# Patient Record
Sex: FEMALE | Race: BLACK OR AFRICAN AMERICAN | Employment: UNEMPLOYED | URBAN - METROPOLITAN AREA
[De-identification: names, ages, dates, MRNs, and addresses within clinical notes are randomized per-mention and may not be internally consistent; named-entity substitution may affect disease eponyms.]

---

## 2020-09-19 ENCOUNTER — HOSPITAL ENCOUNTER (EMERGENCY)
Age: 50
Discharge: HOME | End: 2020-09-19
Payer: COMMERCIAL

## 2020-09-19 VITALS
DIASTOLIC BLOOD PRESSURE: 90 MMHG | OXYGEN SATURATION: 98 % | RESPIRATION RATE: 16 BRPM | BODY MASS INDEX: 40.53 KG/M2 | TEMPERATURE: 99 F | HEART RATE: 85 BPM | WEIGHT: 236.11 LBS | SYSTOLIC BLOOD PRESSURE: 128 MMHG

## 2020-09-19 PROCEDURE — 99283 EMERGENCY DEPT VISIT LOW MDM: CPT

## 2020-09-19 RX ORDER — PENICILLIN V POTASSIUM 500 MG/1
500 TABLET ORAL 4 TIMES DAILY
Qty: 28 TABLET | Refills: 0 | Status: SHIPPED | OUTPATIENT
Start: 2020-09-19 | End: 2020-09-26

## 2020-09-19 ASSESSMENT — ENCOUNTER SYMPTOMS
ABDOMINAL PAIN: 0
ABDOMINAL DISTENTION: 0
VOMITING: 0
COLOR CHANGE: 0
EYE REDNESS: 0
DIARRHEA: 0
NAUSEA: 0
BACK PAIN: 0
SINUS PRESSURE: 0
SINUS PAIN: 0
SHORTNESS OF BREATH: 0
EYE PAIN: 0

## 2020-09-19 ASSESSMENT — PAIN DESCRIPTION - LOCATION: LOCATION: EAR

## 2020-09-19 ASSESSMENT — PAIN SCALES - GENERAL: PAINLEVEL_OUTOF10: 8

## 2020-09-19 ASSESSMENT — PAIN DESCRIPTION - ORIENTATION: ORIENTATION: RIGHT;LEFT

## 2020-09-19 ASSESSMENT — PAIN DESCRIPTION - FREQUENCY: FREQUENCY: CONTINUOUS

## 2020-09-19 ASSESSMENT — PAIN DESCRIPTION - PAIN TYPE: TYPE: ACUTE PAIN

## 2020-09-19 ASSESSMENT — PAIN DESCRIPTION - PROGRESSION: CLINICAL_PROGRESSION: GRADUALLY WORSENING

## 2020-09-19 ASSESSMENT — PAIN - FUNCTIONAL ASSESSMENT: PAIN_FUNCTIONAL_ASSESSMENT: ACTIVITIES ARE NOT PREVENTED

## 2020-09-19 ASSESSMENT — PAIN DESCRIPTION - ONSET: ONSET: ON-GOING

## 2020-09-19 ASSESSMENT — PAIN DESCRIPTION - DESCRIPTORS: DESCRIPTORS: DISCOMFORT

## 2020-09-20 NOTE — ED PROVIDER NOTES
Respiratory: Negative for shortness of breath. Cardiovascular: Negative for chest pain. Gastrointestinal: Negative for abdominal distention, abdominal pain, diarrhea, nausea and vomiting. Musculoskeletal: Negative for back pain. Skin: Negative for color change and rash. Allergic/Immunologic: Negative for immunocompromised state. Neurological: Positive for dizziness and headaches. Negative for syncope, speech difficulty, weakness and numbness. Hematological: Negative for adenopathy. Psychiatric/Behavioral: Negative for confusion. All other systems reviewed and are negative. Positives and Pertinent negatives as per HPI. Except as noted above in the ROS, problem specific ROS was completed and is negative. Physical Exam:  Physical Exam  Vitals signs and nursing note reviewed. Constitutional:       General: She is not in acute distress. Appearance: Normal appearance. She is well-developed. She is not toxic-appearing. HENT:      Head: Normocephalic and atraumatic. Right Ear: Tympanic membrane and ear canal normal.      Left Ear: Tympanic membrane and ear canal normal.      Mouth/Throat:      Lips: Pink. Mouth: Mucous membranes are moist.      Dentition: Abnormal dentition. Pharynx: Oropharynx is clear. Eyes:      General: No scleral icterus. Extraocular Movements: Extraocular movements intact. Conjunctiva/sclera: Conjunctivae normal.      Pupils: Pupils are equal, round, and reactive to light. Comments: No skew deviation   Neck:      Musculoskeletal: Full passive range of motion without pain and neck supple. No neck rigidity. Vascular: No JVD. Cardiovascular:      Rate and Rhythm: Normal rate and regular rhythm. Heart sounds: Normal heart sounds. Pulmonary:      Effort: Pulmonary effort is normal. No respiratory distress. Breath sounds: Normal breath sounds. Abdominal:      General: There is no distension.       Palpations: Abdomen is soft. Abdomen is not rigid. Tenderness: There is no abdominal tenderness. Musculoskeletal: Normal range of motion. Skin:     General: Skin is warm and dry. Capillary Refill: Capillary refill takes less than 2 seconds. Findings: No rash. Neurological:      Comments: NIH 0 with no focal deficits. Patient is awake, alert & oriented x4 and speaking in complete sentences without dysphasia or slurring of their words, CN II-XII grossly intact, good coordination with normal finger-to-nose and heel-to-shin test, 5/5 motor strength in all 4 extremities, sensation to light touch intact bilaterally, patellar and achilles reflexes 2+ and equal bilaterally, gait is normal without ataxia, no truncal ataxia. Psychiatric:         Mood and Affect: Mood normal.         MEDICAL DECISION MAKING    Vitals:    Vitals:    09/19/20 2153   BP: (!) 128/90   Pulse: 85   Resp: 16   Temp: 99 °F (37.2 °C)   TempSrc: Oral   SpO2: 98%   Weight: 236 lb 1.8 oz (107.1 kg)       LABS:Labs Reviewed - No data to display     Remainder of labs reviewed and werenegative at this time or not returned at the time of this note. RADIOLOGY:   Non-plain film images such as CT, Ultrasound and MRI are read by the radiologist. RIAZ Huitron CNP have directly visualized the radiologic plain film image(s) with the below findings:        Interpretation per the Radiologist below, if available at the time of this note:    No orders to display        No results found. MEDICAL DECISION MAKING / ED COURSE:      PROCEDURES:   Procedures    None    Patient was given:  Medications - No data to display    Differential Diagnosis: Dental Abscess, Airway Obstruction, Umer's Angina, Bacteremia/Sepsis.   Differential diagnosis: Ménière's disease, benign positional vertigo, stroke or TIA in the posterior circulation, bleed of the cerebellopontine angle, cardiac arrhythmia, anemia, dehydration, sepsis, Metabolic emergency, Multiple Sclerosis, brain or ENT tumor, other    Patient seen and examined today for headache, dizziness, ear pain, tooth pain. See HPI for patient presentation. Patient is hemodynamically stable, nontoxic, afebrile, and without tachycardia, tachypnea, and hypoxia. Physical exam as above. Well-appearing pleasant 44-year-old female lying in bed in no acute distress. She is awake alert and oriented with absolutely no neurovascular deficits. She denies any headache or dizziness on my exam.  I had her ambulate around the emergency department and she ambulated with normal gait. I did a complete neurovascular exam and she had no deficits and she had an NIH of 0. I have no suspicion for stroke or other intracranial abnormality. TMs are benign bilaterally. Tooth #20 is cracked which she thinks is causing the symptoms. She has no chest pain or shortness of breath and I have no suspicion for ACS or PE. Patient be started on penicillin and discharged home in stable condition. Patient denies dysphasia, dyspnea, neck stiffness or odynophagia. There is no brawny edema, uvular deviation, meningismus, stridor, trismus or drooling. I estimate there is LOW risk for a DEEP SPACE INFECTION (e.g., ERIKAS ANGINA OR RETROPHARYNGEAL ABSCESS), MENINGITIS, or AIRWAY COMPROMISE. There is also NO HEART MURMUR NOTED ON EXAM thus I consider the discharge disposition reasonable. Also,  Patient has a repeat neurological exam. At this time there is no indication of head injury, encephalopathy, multiple sclerosis, TIA/CVA, seizures, dehydration, hypoglycemia, mass lesions, metabolic cause, cardiac arrhythmia, PE, GI bleeding or orthostatic hypotension. Patient is stable throughout ED course and safe for outpatient follow-up. Patient made aware of treatment plan and verbally understood and agreed. Patient stable for outpatient follow-up with their family doctor in 24-48 hours.   At this time, the evidence for any other entities in the differential is insufficient to justify any further testing. This was explained to the patient. The patient was advised that persistent or worsening symptoms will require further evaluation. The patient tolerated their visit well. I evaluated the patient. The physician was available for consultation as needed. The patient and / or the family were informed of the results of any tests, a time was given to answer questions, a plan was proposed and they agreed with plan. CLINICAL IMPRESSION:  1.  Cracked tooth        DISPOSITION Decision To Discharge 09/19/2020 10:03:49 PM      PATIENT REFERRED TO:  [address] [place] [address]    Schedule an appointment as soon as possible for a visit       [facility]  [address] [address]  [phone]  Go to   As needed      DISCHARGE MEDICATIONS:  New Prescriptions    PENICILLIN V POTASSIUM (VEETID) 500 MG TABLET    Take 1 tablet by mouth 4 times daily for 7 days       DISCONTINUED MEDICATIONS:  Discontinued Medications    No medications on file              (Please note the MDM and HPI sections of this note were completed with a voice recognition program.  Efforts were made to edit the dictations but occasionally words are mis-transcribed.)    Electronically signed, RIAZ Lora CNP,           RIAZ Lora CNP  09/19/20 2414

## 2020-09-21 ENCOUNTER — CARE COORDINATION (OUTPATIENT)
Dept: CASE MANAGEMENT | Age: 50
End: 2020-09-21

## 2020-09-21 NOTE — CARE COORDINATION
healthcare professional if you have concerns about COVID-19 and your underlying condition or if you are sick. For more information on steps you can take to protect yourself, see CDC's How to [place] for follow-up call in 7-14 days.

## 2020-10-07 ENCOUNTER — CARE COORDINATION (OUTPATIENT)
Dept: CASE MANAGEMENT | Age: 50
End: 2020-10-07

## 2020-10-07 NOTE — CARE COORDINATION
Patient resolved from the Care Transitions episode on 10/7/2020  Discussed COVID-19 related testing which was not done at this time. Patient has been provided the following resources and education related to COVID-19:                         Signs, symptoms and red flags related to COVID-19            CDC exposure and quarantine guidelines            [facility] exposure contact - [phone]            Contact for their local Department of Health                patient reports all symptoms resolved and no new/worsening symptoms     No further outreach scheduled with this ACM. Episode of Care resolved. Patient has this ACM contact information if future needs arise.

## 2021-09-08 ENCOUNTER — HOSPITAL ENCOUNTER (EMERGENCY)
Age: 51
Discharge: HOME | End: 2021-09-08
Payer: COMMERCIAL

## 2021-09-08 VITALS
DIASTOLIC BLOOD PRESSURE: 77 MMHG | OXYGEN SATURATION: 99 % | HEART RATE: 64 BPM | RESPIRATION RATE: 17 BRPM | TEMPERATURE: 98.3 F | SYSTOLIC BLOOD PRESSURE: 149 MMHG

## 2021-09-08 DIAGNOSIS — U07.1 COVID-19: Primary | ICD-10-CM

## 2021-09-08 DIAGNOSIS — R51.9 ACUTE NONINTRACTABLE HEADACHE, UNSPECIFIED HEADACHE TYPE: ICD-10-CM

## 2021-09-08 PROCEDURE — 99283 EMERGENCY DEPT VISIT LOW MDM: CPT

## 2021-09-08 PROCEDURE — 6370000000 HC RX 637 (ALT 250 FOR IP): Performed by: PHYSICIAN ASSISTANT

## 2021-09-08 RX ORDER — ACETAMINOPHEN 325 MG/1
650 TABLET ORAL ONCE
Status: COMPLETED | OUTPATIENT
Start: 2021-09-08 | End: 2021-09-08

## 2021-09-08 RX ADMIN — ACETAMINOPHEN 650 MG: 325 TABLET ORAL at 10:45

## 2021-09-08 ASSESSMENT — PAIN SCALES - GENERAL
PAINLEVEL_OUTOF10: 9
PAINLEVEL_OUTOF10: 8

## 2021-09-08 ASSESSMENT — PAIN DESCRIPTION - FREQUENCY: FREQUENCY: CONTINUOUS

## 2021-09-08 ASSESSMENT — ENCOUNTER SYMPTOMS
RESPIRATORY NEGATIVE: 1
ABDOMINAL PAIN: 0
RHINORRHEA: 0
VOMITING: 0
NAUSEA: 0

## 2021-09-08 ASSESSMENT — PAIN DESCRIPTION - LOCATION: LOCATION: HEAD

## 2021-09-08 ASSESSMENT — PAIN DESCRIPTION - DESCRIPTORS: DESCRIPTORS: ACHING

## 2021-09-08 ASSESSMENT — PAIN DESCRIPTION - PAIN TYPE: TYPE: ACUTE PAIN

## 2021-09-08 ASSESSMENT — PAIN DESCRIPTION - ONSET: ONSET: GRADUAL

## 2021-09-08 ASSESSMENT — PAIN DESCRIPTION - PROGRESSION: CLINICAL_PROGRESSION: NOT CHANGED

## 2021-09-08 NOTE — ED PROVIDER NOTES
[address]  [address] [address]  Dept: [phone]  Loc: [phone]  eMERGENCYdEPARTMENT eNCOUnter      Pt Name: Pj Comer  MRN: [id number]  Jade 1970  Date of evaluation: 9/8/2021  Provider:Shana Mabry PA-C    CHIEF COMPLAINT       Chief Complaint   Patient presents with    Headache     was dx with covid 10 days ago symptoms started 13 days ago, has had headaches on and off, she reports headache started yesterday that the base of her skull        CRITICAL CARE TIME   Total Critical Care time was 0 minutes, excluding separately reportable procedures. There was a high probability of clinically significant/life threatening deterioration in the patient's condition which required my urgentintervention. HISTORY OF PRESENT ILLNESS  (Location/Symptom, Timing/Onset, Context/Setting, Quality, Duration,Modifying Factors, Severity.)   Pj Comer is a 52 y.o. female who presents to the emergency department by private vehicle with complaints of headache and concern for low blood pressure. Patient has a known COVID-19. Patient is been sick for the past 13 days, diagnosed 10 days ago. Patient has had intermittent headaches that wax and wane in severity since onset. Headache is relieved with ibuprofen. He describes a pressure sensation to the back of head. She has no associated nausea, vomiting, vision changes, extremity numbness/tingling/weakness. Headache was not alleviated with ibuprofen today. Patient also has been taking her antihypertensives and not been eating significant amount of food. She is concerned blood pressure was low. Given these 2 concerns she sought evaluation in the ED. She denies any fevers, neck stiffness, vision changes. No change to headache today. This is the same headache she has been dealing with since onset of symptoms. Pain rated 9/10 severity.   No other complaints this back: Normal range of motion and neck supple. No rigidity. Skin:     General: Skin is warm. Neurological:      General: No focal deficit present. Mental Status: She is alert and oriented to person, place, and time. Cranial Nerves: No cranial nerve deficit. Sensory: No sensory deficit. Motor: No weakness. Coordination: Coordination normal.   Psychiatric:         Mood and Affect: Mood normal.         Behavior: Behavior normal.           DIAGNOSTIC RESULTS     EKG: All EKG's are interpreted by the Emergency Department Physician who either signs or Co-signs this chart in the absence of a cardiologist.    RADIOLOGY:   Non-plain film images such as CT, Ultrasound and MRI are read by the radiologist. Plain radiographic images are visualized and preliminarilyinterpreted by the emergency physician with the below findings:    Interpretation per the Radiologist below,if available at the time of this note:    No orders to display         LABS:  Labs Reviewed - No data to display    All other labs were within normal range or not returned as of this dictation. EMERGENCY DEPARTMENT COURSE and DIFFERENTIAL DIAGNOSIS/MDM:   Vitals:    Vitals:    09/08/21 0929 09/08/21 1013   BP: (!) 156/98 133/77   Pulse: 63    Resp: 14    Temp: 98.3 °F (36.8 °C)    TempSrc: Oral    SpO2: 98%        MDM     Patient presents to the ED with HPI noted above. Blood pressure initially elevated, at my bedside exam blood pressure 133/77. Patient is afebrile nontoxic-appearing. Oxygen saturation 98% on room air. Patient with COVID-19. Patient is had headaches throughout course of illness. Denies any change in headache today. Headache waxes and wanes in severity. No new features or change to headache. Headache subsiding since arrival to the ED without intervention. Did give Tylenol in the ED for headache. She is neurologically intact throughout. She has no meningeal signs and she is afebrile.   Given lack of change in headache, known COVID-19 and improvement without intervention in the ED do not be any further emergent work-up or evaluation indicated. Low suspicion for SAH, meningitis or acute intercranial abnormality. Patient educated on concerning symptoms that should prompt reevaluation to the ED. Remainder of exam as above and unremarkable. Patient well-appearing. Patient follow-up PCP in 2 to 3 days for reevaluation. She is discharged home in stable condition. The patient tolerated their visit well. I saw the patient independently with physician available for consultation as needed. I have discussed the findings of today's workup with the patient and addressed the patient's questions and concerns. Important warning signs as well as new or worsening symptoms which would necessitate immediate return to the ED were discussed. The plan is to discharge from the ED at this time, and the patient is in stable condition. The patient acknowledged understanding is agreeable with this plan. CONSULTS:  None    PROCEDURES:  Procedures    FINAL IMPRESSION      1. COVID-19    2. Acute nonintractable headache, unspecified headache type          DISPOSITION/PLAN   [unfilled]    PATIENT REFERRED TO:  [facility]  [id number] [phone]  Go to   If symptoms worsen    [facility]    Schedule an appointment as soon as possible for a visit in 3 days  For follow up and reevaluation.       DISCHARGE MEDICATIONS:  New Prescriptions    No medications on file       (Please note that portions of this note were completed with a voice recognition program.  Efforts were made to edit the dictations but occasionally words are mis-transcribed.)    [address] [facility], PA-C          [address] [place], [place]  09/08/21 6740

## 2021-09-08 NOTE — ED NOTES
Pt states that she started with a head ache when she was first symptomatic with covid 13 days ago she was dx 10 days ago. She states that she started back with a headache yesterday ,. The pain is at the base of her her skull, she has never been dx with headaches in the past , she states that she has not taken anything for this headache . She is worried that it could be her BP, she otherwise feels ok.  Call light in each      Williams Butcher RN  09/08/21 8011

## 2025-03-26 ENCOUNTER — OFFICE VISIT (OUTPATIENT)
Age: 55
End: 2025-03-26

## 2025-03-26 VITALS
HEART RATE: 67 BPM | TEMPERATURE: 98.2 F | SYSTOLIC BLOOD PRESSURE: 152 MMHG | OXYGEN SATURATION: 97 % | DIASTOLIC BLOOD PRESSURE: 80 MMHG | RESPIRATION RATE: 18 BRPM

## 2025-03-26 DIAGNOSIS — R03.0 ELEVATED BLOOD PRESSURE READING: ICD-10-CM

## 2025-03-26 DIAGNOSIS — H10.9 CONJUNCTIVITIS OF LEFT EYE, UNSPECIFIED CONJUNCTIVITIS TYPE: Primary | ICD-10-CM

## 2025-03-26 RX ORDER — ATORVASTATIN CALCIUM 20 MG/1
20 TABLET, FILM COATED ORAL DAILY
COMMUNITY

## 2025-03-26 RX ORDER — PREDNISONE 20 MG/1
20 TABLET ORAL DAILY
Qty: 5 TABLET | Refills: 0 | Status: SHIPPED | OUTPATIENT
Start: 2025-03-26 | End: 2025-03-31

## 2025-03-26 RX ORDER — NEOMYCIN SULFATE, POLYMYXIN B SULFATE AND DEXAMETHASONE 3.5; 10000; 1 MG/ML; [USP'U]/ML; MG/ML
2 SUSPENSION/ DROPS OPHTHALMIC 3 TIMES DAILY
Qty: 1 EACH | Refills: 0 | Status: SHIPPED | OUTPATIENT
Start: 2025-03-26 | End: 2025-04-05

## 2025-03-26 RX ORDER — LISINOPRIL 10 MG/1
10 TABLET ORAL DAILY
COMMUNITY

## 2025-03-26 RX ORDER — BUPROPION HYDROCHLORIDE 300 MG/1
1 TABLET ORAL EVERY MORNING
COMMUNITY
Start: 2025-02-17

## 2025-03-26 NOTE — PATIENT INSTRUCTIONS
Thank you for allowing us to care for you today and we hope you feel better soon  OTC allergy medicine is helpful but will not cure symptoms  New Prescriptions    NEOMYCIN-POLYMYXIN-DEXAMETH (MAXITROL) 3.5-33257-0.1 OPHTHALMIC SUSPENSION    Place 2 drops into the left eye 3 times daily for 10 days    PREDNISONE (DELTASONE) 20 MG TABLET    Take 1 tablet by mouth daily for 5 days

## 2025-03-26 NOTE — PROGRESS NOTES
Laura Hayes (:  1970) is a 54 y.o. female,New patient, here for evaluation of the following chief complaint(s):  Eye Problem (Pt c/o left eye swelling, drainage 2 days ago and no drainage now, pt states she feels sluggish)      ASSESSMENT/PLAN:    ICD-10-CM    1. Conjunctivitis of left eye, unspecified conjunctivitis type  H10.9 predniSONE (DELTASONE) 20 MG tablet     neomycin-polymyxin-dexameth (MAXITROL) 3.5-20990-2.1 ophthalmic suspension      2. Elevated blood pressure reading  R03.0 Amb Referral to Primary Care          Dx Disposition: conjunctivitis  Education and handout provided on diagnosis and management of symptoms.   AVS reviewed with patient. Follow up as needed in UC or with PCP for new or worsening symptoms.   Return if symptoms worsen or fail to improve.    SUBJECTIVE/OBJECTIVE:  Patient presents today with complaints of left eye swelling and drainage that started 2 days ago and today is sluggish feeling      History provided by:  Patient   used: No    Eye Problem         Vitals:    25 1507   BP: (!) 150/85   Pulse: 67   Resp: 18   Temp: 98.2 °F (36.8 °C)   SpO2: 97%       Review of Systems    Physical Exam  Constitutional:       Appearance: Normal appearance.   HENT:      Head: Normocephalic.      Right Ear: Tympanic membrane is bulging.      Left Ear: Tympanic membrane is bulging.      Nose: Nose normal.      Mouth/Throat:      Mouth: Mucous membranes are moist.      Pharynx: Oropharynx is clear. Posterior oropharyngeal erythema present.   Eyes:      Comments: Left upper lid slightly swollen no drainage or crusting to eye noted   Cardiovascular:      Rate and Rhythm: Normal rate and regular rhythm.      Heart sounds: Normal heart sounds.   Pulmonary:      Effort: Pulmonary effort is normal.      Breath sounds: Normal breath sounds.   Musculoskeletal:         General: Normal range of motion.      Cervical back: Normal range of motion and neck supple.   Skin:

## 2025-07-08 ENCOUNTER — OFFICE VISIT (OUTPATIENT)
Age: 55
End: 2025-07-08

## 2025-07-08 VITALS
WEIGHT: 222 LBS | HEART RATE: 60 BPM | HEIGHT: 64 IN | BODY MASS INDEX: 37.9 KG/M2 | OXYGEN SATURATION: 96 % | DIASTOLIC BLOOD PRESSURE: 90 MMHG | SYSTOLIC BLOOD PRESSURE: 141 MMHG

## 2025-07-08 DIAGNOSIS — K04.7 DENTAL INFECTION: Primary | ICD-10-CM

## 2025-07-08 RX ORDER — TRETINOIN 0.5 MG/G
CREAM TOPICAL NIGHTLY
COMMUNITY
Start: 2025-01-27

## 2025-07-08 RX ORDER — HYDROXYZINE HYDROCHLORIDE 25 MG/1
25 TABLET, FILM COATED ORAL 3 TIMES DAILY PRN
COMMUNITY
Start: 2024-08-15

## 2025-07-08 RX ORDER — CHLORHEXIDINE GLUCONATE ORAL RINSE 1.2 MG/ML
15 SOLUTION DENTAL 2 TIMES DAILY
Qty: 420 ML | Refills: 0 | Status: SHIPPED | OUTPATIENT
Start: 2025-07-08 | End: 2025-07-22

## 2025-07-08 RX ORDER — LISINOPRIL 5 MG/1
5 TABLET ORAL DAILY
COMMUNITY
Start: 2025-05-24

## 2025-07-08 RX ORDER — ACETAMINOPHEN 500 MG
500 TABLET ORAL EVERY 6 HOURS PRN
COMMUNITY
Start: 2025-05-14

## 2025-07-08 RX ORDER — SIMETHICONE 180 MG
180 CAPSULE ORAL EVERY 6 HOURS PRN
COMMUNITY
Start: 2025-05-14

## 2025-07-08 RX ORDER — IBUPROFEN 800 MG/1
800 TABLET, FILM COATED ORAL 3 TIMES DAILY PRN
Qty: 30 TABLET | Refills: 0 | Status: SHIPPED | OUTPATIENT
Start: 2025-07-08 | End: 2025-07-18

## 2025-07-08 RX ORDER — PROPRANOLOL HCL 20 MG
20 TABLET ORAL DAILY
COMMUNITY

## 2025-07-08 RX ORDER — AMPICILLIN TRIHYDRATE 500 MG
1000 CAPSULE ORAL DAILY
COMMUNITY
Start: 2025-02-17

## 2025-07-08 RX ORDER — CELECOXIB 200 MG/1
200 CAPSULE ORAL 2 TIMES DAILY
COMMUNITY
Start: 2025-06-25

## 2025-07-08 RX ORDER — HYDROCHLOROTHIAZIDE 12.5 MG/1
CAPSULE ORAL DAILY
COMMUNITY
Start: 2025-05-07

## 2025-07-08 RX ORDER — CLINDAMYCIN HYDROCHLORIDE 300 MG/1
300 CAPSULE ORAL 3 TIMES DAILY
Qty: 30 CAPSULE | Refills: 0 | Status: SHIPPED | OUTPATIENT
Start: 2025-07-08 | End: 2025-07-18

## 2025-07-08 NOTE — PROGRESS NOTES
Laura Hayes (: 1970) is a 54 y.o. female, New patient, here for evaluation of the following chief complaint(s):  Dental Pain (1 week right side )      ASSESSMENT/PLAN:    ICD-10-CM    1. Dental infection  K04.7 clindamycin (CLEOCIN) 300 MG capsule     ibuprofen (ADVIL;MOTRIN) 800 MG tablet     chlorhexidine (PERIDEX) 0.12 % solution            Discussed PCP follow up for persisting or worsening symptoms, or to return to the clinic if unable to obtain PCP follow up for worsening symptoms.    The patient tolerated their visit well. The patient and/or the family were informed of the results of any tests, a time was given to answer questions, a plan was proposed and they agreed with plan. Reviewed AVS with treatment instructions and answered questions - pt/family expresses understanding and agreement with the discussed treatment plan and AVS instructions.      SUBJECTIVE/OBJECTIVE:  HPI     Dental Pain     Additional comments: 1 week right side           Last edited by Radha Mullins on 2025 10:08 AM.            Dental Pain         VITAL SIGNS  Vitals:    25 1007   BP: (!) 141/90   Pulse: 60   SpO2: 96%   Weight: 100.7 kg (222 lb)   Height: 1.626 m (5' 4\")       Review of Systems  See HPI for pertinent positives and negatives.    Physical Exam  Constitutional:       General: She is not in acute distress.     Appearance: Normal appearance.   HENT:      Head: Normocephalic and atraumatic.      Right Ear: Tympanic membrane and ear canal normal.      Left Ear: Tympanic membrane and ear canal normal.      Nose: Nose normal.      Mouth/Throat:      Mouth: Mucous membranes are moist.      Comments: Examination of oral cavity shows right lower jaw with swelling and broken molar with gumline erythema.     Eyes:      Pupils: Pupils are equal, round, and reactive to light.   Cardiovascular:      Rate and Rhythm: Normal rate.   Pulmonary:      Effort: Pulmonary effort is normal.   Lymphadenopathy:

## 2025-07-08 NOTE — PATIENT INSTRUCTIONS
Laura,    Thank you for trusting [facility] with your health care needs. Your decision to come to us means a lot, and we are honored to be part of your healthcare journey.  At [facility] [place], our dedicated team is committed to providing you with the highest quality of care in a warm and welcoming environment. Your health and well-being are our top priorities, and we appreciate the opportunity to serve you.    Thank you for choosing us, and we’re here for you whenever you need us!    Warm regards,       [facility] Team    [] Dr. Bay [] BRIE Maki, Supervisor       [] RT Mellisa    [] BRIE Robertson     [] BRIE Benito       [] BRIE Barboza    [] BRIE Contreras